# Patient Record
Sex: FEMALE | URBAN - METROPOLITAN AREA
[De-identification: names, ages, dates, MRNs, and addresses within clinical notes are randomized per-mention and may not be internally consistent; named-entity substitution may affect disease eponyms.]

---

## 2019-11-07 ENCOUNTER — HISTORICAL (OUTPATIENT)
Dept: ADMINISTRATIVE | Facility: HOSPITAL | Age: 24
End: 2019-11-07

## 2019-11-11 ENCOUNTER — HISTORICAL (OUTPATIENT)
Dept: ENDOSCOPY | Facility: HOSPITAL | Age: 24
End: 2019-11-11

## 2022-05-03 NOTE — HISTORICAL [facility] CERNER
This is a historical note converted from Tanya. Formatting and pictures may have been removed.  Please reference Tanya for original formatting and attached multimedia. History of Present Illness  Cecile is a 24 year old female with history of recurrent choledocholithiasis here for an ERCP.  ?  She presented back in January with abdominal pain and choledocholithiasis, cholelithiasis.? She had an ERCP first with Dr. Mcmullen which showed number CBD stones.? She underwent ERCP on January 5th with removal of multiple CBD stones.? There were noted to be cystic duct stones as well.? She underwent cholecystectomy with Dr. Reynaga on January 8th.? Intraoperatively an IOC was performed that showed a possible stone in the distal duct.? She was taken from the OR to ERCP by Dr. Leyva which showed mild narrowing and possible filling defect just below the cystic duct insertion on cholangiogram.? Balloon sweep removed 2 small stones and microlithiasis.? She was discharged home.  ?  She represented as a transfer to [facility] for fever, elevated LFTs (alk phos 500s, TB 1.8) per notes in October.? CT scan at outside facility suggested a calcification in the distal CBD.? ERCP by Dr. Andre on October 13th ?showed purulence from cbd and balloon sweep removed small stones, debris, and purulence from CBD.? A 7Fr 7 cm stent was placed.  ?  Since that time she has done well.? She reported some upper abdominal pain 2 weeks ago that has improved.? She had some itching on palms and feet that started last week.? She did go to the ED in [place] and was treated supportively.? They told her she had a UTI and that LFTS were not high enough to cause itching.? She recently delivered a baby a few weeks ago.  Review of Systems  Comprehensive Review of Systems performed with no exceptions other than as noted in HPI.  ?  Physical Exam  Vitals & Measurements  T:?36.8? ?C (Oral)? HR:?69(Peripheral)? RR:?16? BP:?102/64? SpO2:?98%?  WT:?105.6?kg?  General:?well-developed well-nourished in no acute distress  Eye: PERRLA, EOMI, clear conjunctiva  HENT:? oropharynx without erythema/exudate, oropharynx and nasal mucosal surfaces moist  Neck:? no thyromegaly or lymphadenopathy, trachea midline  Respiratory:?symmetrical chest expansion and respiratory effort, clear to auscultation bilaterally  Cardiovascular:?regular rate and rhythm without murmurs, gallops or rubs  Gastrointestinal:?obese, soft, non-tender, non-distended with normal bowel sounds, without masses to palpation  Integumentary: no rashes or skin lesions present  Neurologic: cranial nerves intact, no asterixis, awake, alert, and oriented  Psych: good insight, appropriate mood, normal affect  ?  Assessment/Plan  Cecile is a 24 year old female with history of recurrent choledocholithiasis here for an ERCP.  ?  Risks, benefits, and alternatives of the procedure discussed.?  Will proceed with endoscopic procedure as scheduled.   Problem List/Past Medical History  Ongoing  No qualifying data  Historical  No qualifying data  Procedure/Surgical History    cholecystectomy  ERCP   Medications  Inpatient  buffered lidocaine 2% - 0.5 ml syringe, 10 mg= 0.5 mL, Subcutaneous, As Directed  indomethacin 100 mg rectal suppository, 100 mg= 2 supp, AL (rectal), Now  IVF Lactated Ringers LR Infusion 1,000 mL, 1000 mL, IV  Home  famotidine 20 mg oral tablet, 20 mg= 1 tab(s), Oral, BID  nitrofurantoin macrocrystals-monohydrate 100 mg oral capsule, 100 mg= 1 cap(s), Oral, BID  ursodiol 250 mg oral tablet, 250 mg= 1 tab(s), Oral, BID  Allergies  No Known Allergies  Social History  Abuse/Neglect  No, 2019  Alcohol  Never, 2019  Tobacco  Never (less than 100 in lifetime), N/A, 2019